# Patient Record
Sex: MALE | Race: ASIAN | URBAN - METROPOLITAN AREA
[De-identification: names, ages, dates, MRNs, and addresses within clinical notes are randomized per-mention and may not be internally consistent; named-entity substitution may affect disease eponyms.]

---

## 2024-10-14 ENCOUNTER — OFFICE VISIT (OUTPATIENT)
Dept: OTOLARYNGOLOGY | Facility: CLINIC | Age: 24
End: 2024-10-14

## 2024-10-14 VITALS
RESPIRATION RATE: 20 BRPM | HEART RATE: 74 BPM | OXYGEN SATURATION: 97 % | HEIGHT: 66 IN | WEIGHT: 145 LBS | BODY MASS INDEX: 23.3 KG/M2

## 2024-10-14 DIAGNOSIS — H73.001 ACUTE MYRINGITIS, RIGHT: ICD-10-CM

## 2024-10-14 DIAGNOSIS — H60.391 OTHER INFECTIVE ACUTE OTITIS EXTERNA OF RIGHT EAR: Primary | ICD-10-CM

## 2024-10-14 DIAGNOSIS — H72.91 PERFORATION OF RIGHT TYMPANIC MEMBRANE: ICD-10-CM

## 2024-10-14 PROCEDURE — 99203 OFFICE O/P NEW LOW 30 MIN: CPT | Performed by: OTOLARYNGOLOGY

## 2024-10-14 PROCEDURE — 92504 EAR MICROSCOPY EXAMINATION: CPT | Performed by: OTOLARYNGOLOGY

## 2024-10-14 RX ORDER — CIPROFLOXACIN AND DEXAMETHASONE 3; 1 MG/ML; MG/ML
3 SUSPENSION/ DROPS AURICULAR (OTIC) EVERY 12 HOURS
Qty: 7.5 ML | Refills: 0 | Status: SHIPPED | OUTPATIENT
Start: 2024-10-14 | End: 2024-10-21

## 2024-10-14 NOTE — PROGRESS NOTES
NEW PATIENT PROGRESS NOTE  OTOLOGY/OTOLARYNGOLOGY    REF MD:  No referring provider defined for this encounter.    PCP: No primary care provider on file.    CHIEF COMPLAINT:    Chief Complaint   Patient presents with    Ear Problem     New patient left ear drum concern and right ear pain       HISTORY OF PRESENT ILLNESS: Scooby Kate is a 24 year old male who presents for evaluation of a tympanic membrane perforation. He reports that when it is cold, his ear runs. He had surgery for the same issue 10 years ago in [facility]. However, a year ago, he developed a recurrent perforation. 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 He does not recall any ear infections during his childhood. His hearing perception is reported to be good, and he does not experience tinnitus, vertigo, or otalgia.     PAST MEDICAL HISTORY:  History reviewed. No pertinent past medical history.    PAST SURGICAL HISTORY:    Past Surgical History:   Procedure Laterality Date    Tympanoplasty Left 2012       Medications Ordered Prior to Encounter[1]    Allergies: Allergies[2]    SOCIAL HISTORY:    Social History     Tobacco Use    Smoking status: Some Days     Types: Cigarettes    Smokeless tobacco: Never   Substance Use Topics    Alcohol use: Yes       FAMILY HISTORY: Denies known family history of hearing loss, tinnitus, vertigo, or migraine.  Denies known family history of head and neck cancer, thyroid cancer, bleeding disorders.     REVIEW OF SYSTEMS:   Positives are in bold  Neuro: Headache, facial weakness, facial numbness, neck pain, vertigo  ENT: Hearing change, tinnitus, otorrhea, otalgia, aural fullness, ear pressure, vertigo, imbalance  Sinus pressure, rhinorrhea, congestion, facial pain, jaw pain, dysphagia, odynophagia, sore throat, voice changes, shortness of breath    EXAMINATION:  I washed my hands with an alcohol-based hand gel prior to examination  Constitutional:   --Vitals: Pulse 74, resp. rate 20, height  5' 6\" (1.676 m), weight 145 lb (65.8 kg), SpO2 97%.  General: no apparent distress, well-developed, conversant  Psych: affect pleasant and appropriate for age, alert and oriented  Neuro: Cranial nerves: EOMI, Facial sensation intact to touch, palate elevates midline, tongue protrudes midline, shoulder shrug intact bilateral, facial movement normal bilateral  Respiratory: No stridor, stertor or increased work of breathing  ENT:  --Ear: (bilateral ears were examined under binocular microscopy)  Right ear microscopic exam:  Pinna: Normal, no lesions or masses.  Mastoid: Nontender on palpation.   External auditory canal: Mucoid otorrhea in ear canal. no masses or lesions.  Tympanic membrane: Thickened abnormal-appearing tympanic membrane with myringitis, with possible blunting. Previous cartilage graft noted with central perforation adjacent. no lesions.   Middle ear: Erythematous with otorrhea.    Left ear microscopic exam:  Pinna: Normal, no lesions or masses.  Mastoid: Nontender on palpation.   External auditory canal: Clear, no masses or lesions.  Tympanic membrane: Intact, no lesions, normal landmarks.  Middle ear: Aerated.    ASSESSMENT/PLAN:  Scooby Kate is a 24 year old male with     ICD-10-CM   1. Other infective acute otitis externa of right ear  H60.391   2. Perforation of right tympanic membrane  H72.91   3. Acute myringitis, right  H73.001        IMPRESSION:  Right tympanic membrane perforation, patient had prior tympanoplasty with cartilage graft in [facility] approximately 10 years ago  Right myringitis  Right otitis externa    PLAN:  -Start Ciprodex drops, place 4 drops into right ear BID for 10 days  -Recommend dry ear precautions  -Follow up in 2 weeks once the infection is resolved; consider audiogram, CT Temporal Bones, and possible discussion of revision surgery    Situation reviewed with the patient in detail.    Attention: This note has been scribed by Shreya Mayes under the supervision of Be  Courtney Faustin MD.     Be Faustin MD  Otology/Otolaryngology  [address]  [place] [address]  Phone [phone]  Fax [phone]      I have personally performed the services described in this documentation. All medical record entries made by the scribe were at my direction and in my presence. I have reviewed the chart and agree that the medical record reflects my personal performance and is accurate and complete.             [1]   No current outpatient medications on file prior to visit.     No current facility-administered medications on file prior to visit.   [2] No Known Allergies

## 2024-10-18 ENCOUNTER — TELEPHONE (OUTPATIENT)
Dept: OTOLARYNGOLOGY | Facility: CLINIC | Age: 24
End: 2024-10-18

## 2024-10-18 NOTE — TELEPHONE ENCOUNTER
Patient states that the medication prescribed on 10-14-24 - cost to much.  Patient states that he was not able to get his medication.

## 2024-10-21 RX ORDER — OFLOXACIN 3 MG/ML
4 SOLUTION AURICULAR (OTIC) 2 TIMES DAILY
Qty: 5 ML | Refills: 0 | Status: SHIPPED | OUTPATIENT
Start: 2024-10-21 | End: 2024-10-31

## 2024-10-30 ENCOUNTER — OFFICE VISIT (OUTPATIENT)
Dept: OTOLARYNGOLOGY | Facility: CLINIC | Age: 24
End: 2024-10-30

## 2024-10-30 VITALS — BODY MASS INDEX: 21.98 KG/M2 | HEIGHT: 68 IN | WEIGHT: 145 LBS

## 2024-10-30 DIAGNOSIS — H60.391 OTHER INFECTIVE ACUTE OTITIS EXTERNA OF RIGHT EAR: ICD-10-CM

## 2024-10-30 DIAGNOSIS — H73.001 ACUTE MYRINGITIS, RIGHT: ICD-10-CM

## 2024-10-30 DIAGNOSIS — H72.91 PERFORATION OF RIGHT TYMPANIC MEMBRANE: Primary | ICD-10-CM

## 2024-10-30 RX ORDER — OFLOXACIN 3 MG/ML
4 SOLUTION/ DROPS OPHTHALMIC 2 TIMES DAILY
Qty: 5 ML | Refills: 0 | Status: SHIPPED | OUTPATIENT
Start: 2024-10-30 | End: 2024-11-09

## 2024-10-30 NOTE — PROGRESS NOTES
NEW PATIENT PROGRESS NOTE  OTOLOGY/OTOLARYNGOLOGY    REF MD:  No referring provider defined for this encounter.    PCP: No primary care provider on file.    CHIEF COMPLAINT:    Chief Complaint   Patient presents with    Follow - Up     Patient states right ear did not improve and has diminished hearing on right side and pressure inside ear, pain     Last Visit: 10/14/24    IMPRESSION:  Right tympanic membrane perforation, patient had prior tympanoplasty with cartilage graft in [facility] approximately 10 years ago  Right myringitis  Right otitis externa    PLAN:  -Start Ciprodex drops, place 4 drops into right ear BID for 10 days  -Recommend dry ear precautions  -Follow up in 2 weeks once the infection is resolved; consider audiogram, CT Temporal Bones, and possible discussion of revision surgery  _____________________________________________________________________________  Interval HX:     HISTORY OF PRESENT ILLNESS: Scooby Kate is a 24 year old male who presents for evaluation of a tympanic membrane perforation. He reports that when it is cold, his ear runs. He had surgery for the same issue 10 years ago in [facility]. However, a year ago, he developed a recurrent perforation. 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 He does not recall any ear infections during his childhood. His hearing perception is reported to be good, and he does not experience tinnitus, vertigo, or otalgia.     PAST MEDICAL HISTORY:  No past medical history on file.    PAST SURGICAL HISTORY:    Past Surgical History:   Procedure Laterality Date    Tympanoplasty Left 2012       Medications Ordered Prior to Encounter[1]    Allergies: Allergies[2]    SOCIAL HISTORY:    Social History     Tobacco Use    Smoking status: Some Days     Types: Cigarettes    Smokeless tobacco: Never   Substance Use Topics    Alcohol use: Yes       FAMILY HISTORY: Denies known family history of hearing loss, tinnitus, vertigo, or  migraine.  Denies known family history of head and neck cancer, thyroid cancer, bleeding disorders.     REVIEW OF SYSTEMS:   Positives are in bold  Neuro: Headache, facial weakness, facial numbness, neck pain, vertigo  ENT: Hearing change, tinnitus, otorrhea, otalgia, aural fullness, ear pressure, vertigo, imbalance  Sinus pressure, rhinorrhea, congestion, facial pain, jaw pain, dysphagia, odynophagia, sore throat, voice changes, shortness of breath    EXAMINATION:  I washed my hands with an alcohol-based hand gel prior to examination  Constitutional:   --Vitals: Height 5' 8\" (1.727 m), weight 145 lb.  General: no apparent distress, well-developed, conversant  Psych: affect pleasant and appropriate for age, alert and oriented  Neuro: Cranial nerves: EOMI, Facial sensation intact to touch, palate elevates midline, tongue protrudes midline, shoulder shrug intact bilateral, facial movement normal bilateral  Respiratory: No stridor, stertor or increased work of breathing  ENT:  --Ear: (bilateral ears were examined under binocular microscopy)  Right ear microscopic exam:  Pinna: Normal, no lesions or masses.  Mastoid: Nontender on palpation.   External auditory canal: Mucoid otorrhea in ear canal. no masses or lesions.  Tympanic membrane: Thickened abnormal-appearing tympanic membrane with myringitis, with possible blunting. Previous cartilage graft noted with central perforation adjacent. no lesions.   Middle ear: Erythematous with otorrhea.    Left ear microscopic exam:  Pinna: Normal, no lesions or masses.  Mastoid: Nontender on palpation.   External auditory canal: Clear, no masses or lesions.  Tympanic membrane: Intact, no lesions, normal landmarks.  Middle ear: Aerated.    ASSESSMENT/PLAN:  Scooby Kate is a 24 year old male with   No diagnosis found.       IMPRESSION:  Right tympanic membrane perforation, patient had prior tympanoplasty with cartilage graft in [facility] approximately 10 years ago  Right  myringitis  Right otitis externa    PLAN:  - Patient was unable to  drops due to cost  - Will switch Ciprodex drops to Ofloxacin ophthalmic drops place 4 drops into right ear BID for 10 days  -Recommend dry ear precautions  -Follow up in 2 weeks once the infection is resolved; consider audiogram, CT Temporal Bones, and possible discussion of revision surgery    Situation reviewed with the patient in detail.    Attention: This note has been scribed by Shania Abebe under the supervision of Be Faustin MD.     Be Faustin MD  Otology/Otolaryngology  [address] [place] [id number]  Phone [phone]  Fax [phone]      I have personally performed the services described in this documentation. All medical record entries made by the scribe were at my direction and in my presence. I have reviewed the chart and agree that the medical record reflects my personal performance and is accurate and complete.             [1]   Current Outpatient Medications on File Prior to Visit   Medication Sig Dispense Refill    ofloxacin 0.3 % Otic Solution Place 4 drops into the right ear 2 (two) times daily for 10 days. (Patient not taking: Reported on 10/30/2024) 5 mL 0     No current facility-administered medications on file prior to visit.   [2] No Known Allergies

## 2024-11-18 ENCOUNTER — OFFICE VISIT (OUTPATIENT)
Dept: OTOLARYNGOLOGY | Facility: CLINIC | Age: 24
End: 2024-11-18

## 2024-11-18 VITALS — BODY MASS INDEX: 20 KG/M2 | WEIGHT: 130.63 LBS

## 2024-11-18 DIAGNOSIS — H60.391 OTHER INFECTIVE ACUTE OTITIS EXTERNA OF RIGHT EAR: ICD-10-CM

## 2024-11-18 DIAGNOSIS — H73.001 ACUTE MYRINGITIS, RIGHT: ICD-10-CM

## 2024-11-18 DIAGNOSIS — H72.91 PERFORATION OF RIGHT TYMPANIC MEMBRANE: Primary | ICD-10-CM

## 2024-11-18 PROCEDURE — 92504 EAR MICROSCOPY EXAMINATION: CPT | Performed by: OTOLARYNGOLOGY

## 2024-11-18 PROCEDURE — 99213 OFFICE O/P EST LOW 20 MIN: CPT | Performed by: OTOLARYNGOLOGY

## 2024-11-18 NOTE — PROGRESS NOTES
PATIENT PROGRESS NOTE  OTOLOGY/OTOLARYNGOLOGY    REF MD:  No referring provider defined for this encounter.    PCP: No primary care provider on file.    CHIEF COMPLAINT:    No chief complaint on file.    Last Visit: 10/30/24  IMPRESSION:  Right tympanic membrane perforation, patient had prior tympanoplasty with cartilage graft in [facility] approximately 10 years ago  Right myringitis  Right otitis externa    PLAN:  - Patient was unable to  drops due to cost  - Will switch Ciprodex drops to Ofloxacin ophthalmic drops place 4 drops into right ear BID for 10 days  -Recommend dry ear precautions  -Follow up in 2 weeks once the infection is resolved; consider audiogram, CT Temporal Bones, and possible discussion of revision surgery  _____________________________________________________________________________  Interval HX: Feeling better. Right ear is not bothering him. Completed otic drops.    HISTORY OF PRESENT ILLNESS: Scooby Kate is a 24 year old male who presents for evaluation of a tympanic membrane perforation. He reports that when it is cold, his ear runs. He had surgery for the same issue 10 years ago in [facility]. However, a year ago, he developed a recurrent perforation. 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 He does not recall any ear infections during his childhood. His hearing perception is reported to be good, and he does not experience tinnitus, vertigo, or otalgia.     PAST MEDICAL HISTORY:  No past medical history on file.    PAST SURGICAL HISTORY:    Past Surgical History:   Procedure Laterality Date    Tympanoplasty Left 2012       Medications Ordered Prior to Encounter[1]    Allergies: Allergies[2]    SOCIAL HISTORY:    Social History     Tobacco Use    Smoking status: Some Days     Types: Cigarettes    Smokeless tobacco: Never   Substance Use Topics    Alcohol use: Yes       FAMILY HISTORY: Denies known family history of hearing loss, tinnitus, vertigo, or  migraine.  Denies known family history of head and neck cancer, thyroid cancer, bleeding disorders.     REVIEW OF SYSTEMS:   Positives are in bold  Neuro: Headache, facial weakness, facial numbness, neck pain, vertigo  ENT: Hearing change, tinnitus, otorrhea, otalgia, aural fullness, ear pressure, vertigo, imbalance  Sinus pressure, rhinorrhea, congestion, facial pain, jaw pain, dysphagia, odynophagia, sore throat, voice changes, shortness of breath    EXAMINATION:  I washed my hands with an alcohol-based hand gel prior to examination  Constitutional:   --Vitals: There were no vitals taken for this visit.  General: no apparent distress, well-developed, conversant  Respiratory: No stridor, stertor or increased work of breathing  ENT:  --Ear: (bilateral ears were examined under binocular microscopy)  Right ear microscopic exam:  Pinna: Normal, no lesions or masses.  Mastoid: Nontender on palpation.   External auditory canal: Clear, no masses or lesions.  Tympanic membrane: Thickened post surgical appearance to tympanic membrane with resolved myringitis. Previous cartilage graft noted with central perforation adjacent. no lesions.   Middle ear: Aerated     Left ear microscopic exam:  Pinna: Normal, no lesions or masses.  Mastoid: Nontender on palpation.   External auditory canal: Clear, no masses or lesions.  Tympanic membrane: Intact, no lesions, normal landmarks.  Middle ear: Aerated.    ASSESSMENT/PLAN:  Scooby Kate is a 24 year old male with   No diagnosis found.       IMPRESSION:  Right tympanic membrane perforation, patient had prior tympanoplasty with cartilage graft in [facility] approximately 10 years ago  Right myringitis - resolved  Right otitis externa - resolved     PLAN:  -Recommend dry ear precautions for untreated water - ocean, lake, etc  -Patient not interested in tympanic membrane repair or audiogram  -Follow-up as needed     Situation reviewed with the patient in detail.    Be Faustin,  MD  Otology/Otolaryngology  [facility]   [address] [place] [address] [address] [facility]  [place] [address]  Phone [phone]  Fax [phone]             [1]   No current outpatient medications on file prior to visit.     No current facility-administered medications on file prior to visit.   [2] No Known Allergies